# Patient Record
Sex: MALE | Race: AMERICAN INDIAN OR ALASKA NATIVE
[De-identification: names, ages, dates, MRNs, and addresses within clinical notes are randomized per-mention and may not be internally consistent; named-entity substitution may affect disease eponyms.]

---

## 2019-09-04 ENCOUNTER — HOSPITAL ENCOUNTER (EMERGENCY)
Dept: HOSPITAL 5 - ED | Age: 34
Discharge: HOME | End: 2019-09-04
Payer: COMMERCIAL

## 2019-09-04 VITALS — DIASTOLIC BLOOD PRESSURE: 62 MMHG | SYSTOLIC BLOOD PRESSURE: 136 MMHG

## 2019-09-04 DIAGNOSIS — Z79.899: ICD-10-CM

## 2019-09-04 DIAGNOSIS — K21.9: Primary | ICD-10-CM

## 2019-09-04 DIAGNOSIS — F17.200: ICD-10-CM

## 2019-09-04 LAB
ALBUMIN SERPL-MCNC: 4.5 G/DL (ref 3.9–5)
ALT SERPL-CCNC: 16 UNITS/L (ref 7–56)
BASOPHILS # (AUTO): 0.1 K/MM3 (ref 0–0.1)
BASOPHILS NFR BLD AUTO: 0.8 % (ref 0–1.8)
BILIRUB UR QL STRIP: (no result)
BLOOD UR QL VISUAL: (no result)
BUN SERPL-MCNC: 14 MG/DL (ref 9–20)
BUN/CREAT SERPL: 13 %
CALCIUM SERPL-MCNC: 9.7 MG/DL (ref 8.4–10.2)
EOSINOPHIL # BLD AUTO: 0.2 K/MM3 (ref 0–0.4)
EOSINOPHIL NFR BLD AUTO: 2.2 % (ref 0–4.3)
HCT VFR BLD CALC: 45.7 % (ref 35.5–45.6)
HEMOLYSIS INDEX: 8
HGB BLD-MCNC: 15.2 GM/DL (ref 11.8–15.2)
LYMPHOCYTES # BLD AUTO: 2.5 K/MM3 (ref 1.2–5.4)
LYMPHOCYTES NFR BLD AUTO: 32.6 % (ref 13.4–35)
MCHC RBC AUTO-ENTMCNC: 33 % (ref 32–34)
MCV RBC AUTO: 94 FL (ref 84–94)
MONOCYTES # (AUTO): 0.6 K/MM3 (ref 0–0.8)
MONOCYTES % (AUTO): 7.7 % (ref 0–7.3)
MUCOUS THREADS #/AREA URNS HPF: (no result) /HPF
PH UR STRIP: 6 [PH] (ref 5–7)
PLATELET # BLD: 265 K/MM3 (ref 140–440)
PROT UR STRIP-MCNC: (no result) MG/DL
RBC # BLD AUTO: 4.87 M/MM3 (ref 3.65–5.03)
RBC #/AREA URNS HPF: 4 /HPF (ref 0–6)
UROBILINOGEN UR-MCNC: < 2 MG/DL (ref ?–2)
WBC #/AREA URNS HPF: 1 /HPF (ref 0–6)

## 2019-09-04 PROCEDURE — 81001 URINALYSIS AUTO W/SCOPE: CPT

## 2019-09-04 PROCEDURE — 85025 COMPLETE CBC W/AUTO DIFF WBC: CPT

## 2019-09-04 PROCEDURE — 36415 COLL VENOUS BLD VENIPUNCTURE: CPT

## 2019-09-04 PROCEDURE — 83690 ASSAY OF LIPASE: CPT

## 2019-09-04 PROCEDURE — 80053 COMPREHEN METABOLIC PANEL: CPT

## 2019-09-04 PROCEDURE — 74176 CT ABD & PELVIS W/O CONTRAST: CPT

## 2019-09-04 NOTE — EMERGENCY DEPARTMENT REPORT
ED Abdominal Pain HPI





- General


Chief Complaint: Abdominal Pain


Stated Complaint: STOMACH PAIN


Time Seen by Provider: 19 17:28


Source: patient


Mode of arrival: Ambulatory


Limitations: No Limitations





- History of Present Illness


Initial Comments: 





34-year-old -American male presents to the emergency room for 

intermittent sharp crampy abdominal pain to the upper abdomen 2 weeks.  Patient

denies any nausea vomiting or diarrhea.  Patient reports he has a history of 

alcoholic pancreatitis.  Patient admits to belching and having acid reflux.  

Patient reports it feels like his pancreatitis.  Patient has taken nothing for 

it.  Denies any nausea vomiting diarrhea.  Patient does have a primary care 

provider since [place] [facility] Complaint: abdominal pain


Onset/Timin


-: week(s)


Location: diffuse


Migration to: no migration


Severity scale (0 -10): 6


Quality: cramping





- Related Data


                                  Previous Rx's











 Medication  Instructions  Recorded  Last Taken  Type


 


Omeprazole 40 mg PO QDAY #20 capsule. 19 Unknown Rx











                                    Allergies











Allergy/AdvReac Type Severity Reaction Status Date / Time


 


No Known Allergies Allergy   Unverified 19 17:00














ED Review of Systems


ROS: 


Stated complaint: STOMACH PAIN


Other details as noted in HPI





Comment: All other systems reviewed and negative


Gastrointestinal: abdominal pain, other (belching, reflux).  denies: nausea, 

vomiting


Skin: denies: rash, lesions





ED Past Medical Hx





- Past Medical History


Previous Medical History?: No





- Surgical History


Past Surgical History?: No





- Social History


Smoking Status: Current Every Day Smoker


Substance Use Type: Alcohol





- Medications


Home Medications: 


                                Home Medications











 Medication  Instructions  Recorded  Confirmed  Last Taken  Type


 


Omeprazole 40 mg PO QDAY #20 capsule. 19  Unknown Rx














ED Physical Exam





- General


Limitations: No Limitations


General appearance: alert, in no apparent distress





- Head


Head exam: Present: atraumatic, normocephalic





- Eye


Eye exam: Present: normal appearance





- ENT


ENT exam: Present: mucous membranes moist





- Neck


Neck exam: Present: normal inspection





- Respiratory


Respiratory exam: Present: normal lung sounds bilaterally.  Absent: respiratory 

distress





- Cardiovascular


Cardiovascular Exam: Present: regular rate, normal rhythm.  Absent: systolic 

murmur, diastolic murmur, rubs, gallop





- GI/Abdominal


GI/Abdominal exam: Present: soft, normal bowel sounds





- Rectal


Rectal exam: Present: deferred





- Extremities Exam


Extremities exam: Present: normal inspection





- Back Exam


Back exam: Present: normal inspection





- Neurological Exam


Neurological exam: Present: alert, oriented X3





- Psychiatric


Psychiatric exam: Present: normal affect, normal mood





- Skin


Skin exam: Present: warm, dry, intact, normal color.  Absent: rash





ED Course


                                   Vital Signs











  19





  17:28


 


Temperature 99.0 F


 


Pulse Rate 54 L


 


Respiratory 18





Rate 


 


Blood Pressure 140/78


 


O2 Sat by Pulse 98





Oximetry 














ED Medical Decision Making





- Lab Data


Result diagrams: 


                                 19 19:03





                                 19 19:34





- Radiology Data


Radiology results: report reviewed





Patient: ELAINA DE PAZ MR#: [id number]


3


: 1985 Acct:[id number]





Age/Sex: 34 / M ADM Date: 19





Loc: ED


Attending Dr:








Ordering Physician: ADELSO CORDOVA


Date of Service: 19


Procedure(s): CT abdomen pelvis wo con


Accession Number(s): [id number]





cc: ADELSO CORDOVA








CT ABDOMEN AND PELVIS WITHOUT IV CONTRAST





INDICATION:


epigastric abdominal pain.





COMPARISON:


None available.





TECHNIQUE:


All CT scans at this facility use dose modulation, automated exposure control, 

iterative


reconstruction or weight based dosing, when appropriate, to reduce radiation 

dose to as low as


reasonably achievable.





FINDINGS:





Lung Bases: No significant abnormality.





Skeletal System: No acute abnormality.





ABDOMEN:


Liver: No significant abnormality.


Gallbladder: No significant abnormality.


Bile Ducts: No significant abnormality.


Pancreas: No significant abnormality.


Spleen: No significant abnormality.


Adrenals: No significant abnormality.


Right Kidney: There are a few punctate nonobstructing right renal stones. There 

is no right


hydronephrosis.


Left Kidney: No significant abnormality.


Upper GI tract: No significant abnormality.


Lymph Nodes: No significant adenopathy.


Aorta: No significant abnormality.


Additional Findings: No significant abnormality.





PELVIS:


Colon: Normal aside from diverticulosis.


Urinary Bladder and Distal Ureters: No significant abnormality.


Appendix: No significant abnormality.


Lymph Nodes: No significant adenopathy.


Additional Findings: None.








IMPRESSION:


1. Within the limitations of non contrast technique, no acute process in the 

abdomen or pelvis.


2. There is minimal right nephrolithiasis. No ureteral stone or hydronephrosis.





Signer Name: Stu Batista MD


Signed: 2019 8:34 PM


Workstation Name: [id number]








Transcribed By: SW


Dictated By: Stu Batista MD


Electronically Authenticated By: Stu Batista MD


Signed Date/Time: 19











DD/DT: 19


TD/TT:





- Medical Decision Making





34-year-old -American male presents to the emergency room for intermitte

nt sharp crampy abdominal pain to the upper abdomen 2 weeks.  Patient denies 

any nausea vomiting or diarrhea.  Patient reports he has a history of alcoholic 

pancreatitis.  Patient admits to belching and having acid reflux.  Patient 

reports it feels like his pancreatitis.  Patient has taken nothing for it.  

Denies any nausea vomiting diarrhea.  Patient does have a primary care provider 

since [place]  








Levsin 0.125 mg and Carafate 1 g by mouth liquid.  CT of abdomen shows no acute 

abnormalities.


Critical care attestation.: 


If time is entered above; I have spent that time in minutes in the direct care 

of this critically ill patient, excluding procedure time.








ED Disposition


Clinical Impression: 


Abdominal pain


Qualifiers:


 Abdominal location: generalized Qualified Code(s): R10.84 - Generalized 

abdominal pain





Acid reflux


Qualifiers:


 Esophagitis presence: without esophagitis Qualified Code(s): K21.9 - Gastro-

esophageal reflux disease without esophagitis





Disposition: DC-01 TO HOME OR SELFCARE


Is pt being admited?: No


Does the pt Need Aspirin: No


Condition: Stable


Instructions:  Gastroesophageal Reflux Disease (ED), Gas and Bloating (ED), 

Abdominal Pain (ED)


Additional Instructions: 


Take medications as prescribed.  Follow-up with gastroenterologist or primary 

care provider


Prescriptions: 


Omeprazole 40 mg PO QDAY #20 capsule.


Referrals: 


[facility] [Primary Care Provider] - 3-5 Days


[facility] [Provider Group] - 3-5 Days


Forms:  Work/School Release Form(ED), Accompanied Note

## 2019-09-04 NOTE — CAT SCAN REPORT
CT ABDOMEN AND PELVIS WITHOUT IV CONTRAST



INDICATION:

epigastric abdominal pain.



COMPARISON:

None available.



TECHNIQUE:

All CT scans at this facility use dose modulation, automated exposure control, iterative reconstructi
on or weight based dosing, when appropriate, to reduce radiation dose to as low as reasonably achieva
ble.



FINDINGS:



Lung Bases: No significant abnormality.



Skeletal System: No acute abnormality.



ABDOMEN:

Liver: No significant abnormality.

Gallbladder: No significant abnormality.  

Bile Ducts: No significant abnormality.

Pancreas: No significant abnormality.

Spleen: No significant abnormality.

Adrenals: No significant abnormality.

Right Kidney: There are a few punctate nonobstructing right renal stones. There is no right hydroneph
rosis.

Left Kidney: No significant abnormality.

Upper GI tract: No significant abnormality.

Lymph Nodes: No significant adenopathy.

Aorta: No significant abnormality. 

Additional Findings: No significant abnormality.



PELVIS:

Colon: Normal aside from diverticulosis.

Urinary Bladder and Distal Ureters: No significant abnormality.

Appendix: No significant abnormality.  

Lymph Nodes: No significant adenopathy.

Additional Findings: None.





IMPRESSION:

1.  Within the limitations of non contrast technique, no acute process in the abdomen or pelvis.

2.  There is minimal right nephrolithiasis. No ureteral stone or hydronephrosis.



Signer Name: Stu Batista MD 

Signed: 9/4/2019 8:34 PM

 Workstation Name: [id number]

## 2019-09-04 NOTE — EMERGENCY DEPARTMENT REPORT
Blank Doc





- Documentation


Documentation: 





This is a 34-year-old male that presents with abdominal pain.  Denies any n/v.  

Stated also has some burping.





This initial assessment/diagnostic orders/clinical plan/treatment(s) is/are 

subject to change based on patient's health status, clinical progression and re-

assessment by fellow clinical providers in the ED.  Further treatment and workup

at subsequent clinical providers discretion.  Patient/guardians urged not to 

elope from the ED as their condition may be serious if not clinically assessed 

and managed.  Initial orders include:


1- Patient sent to [facility] for further evaluation and treatment


2- labs


3- UA